# Patient Record
Sex: MALE | URBAN - METROPOLITAN AREA
[De-identification: names, ages, dates, MRNs, and addresses within clinical notes are randomized per-mention and may not be internally consistent; named-entity substitution may affect disease eponyms.]

---

## 2023-08-25 ENCOUNTER — OFFICE VISIT (OUTPATIENT)
Age: 58
End: 2023-08-25
Payer: COMMERCIAL

## 2023-08-25 VITALS
OXYGEN SATURATION: 97 % | BODY MASS INDEX: 30.75 KG/M2 | HEIGHT: 72 IN | WEIGHT: 227 LBS | HEART RATE: 63 BPM | SYSTOLIC BLOOD PRESSURE: 122 MMHG | DIASTOLIC BLOOD PRESSURE: 82 MMHG

## 2023-08-25 DIAGNOSIS — I25.10 ATHEROSCLEROSIS OF NATIVE CORONARY ARTERY OF NATIVE HEART WITHOUT ANGINA PECTORIS: Primary | ICD-10-CM

## 2023-08-25 DIAGNOSIS — E78.5 HYPERLIPIDEMIA, UNSPECIFIED HYPERLIPIDEMIA TYPE: ICD-10-CM

## 2023-08-25 DIAGNOSIS — I10 ESSENTIAL HYPERTENSION: ICD-10-CM

## 2023-08-25 DIAGNOSIS — I65.21 STENOSIS OF RIGHT CAROTID ARTERY: ICD-10-CM

## 2023-08-25 DIAGNOSIS — F17.200 TOBACCO USE DISORDER: ICD-10-CM

## 2023-08-25 DIAGNOSIS — Z95.1 HISTORY OF CORONARY ARTERY BYPASS GRAFT X 3: ICD-10-CM

## 2023-08-25 DIAGNOSIS — I50.22 CHRONIC SYSTOLIC HEART FAILURE (HCC): ICD-10-CM

## 2023-08-25 DIAGNOSIS — I73.9 PAD (PERIPHERAL ARTERY DISEASE) (HCC): ICD-10-CM

## 2023-08-25 DIAGNOSIS — I25.5 ISCHEMIC CARDIOMYOPATHY: ICD-10-CM

## 2023-08-25 PROBLEM — I70.90 UNSPECIFIED ATHEROSCLEROSIS: Status: ACTIVE | Noted: 2023-08-25

## 2023-08-25 PROBLEM — K59.00 CONSTIPATION, UNSPECIFIED: Status: ACTIVE | Noted: 2023-08-25

## 2023-08-25 PROBLEM — I65.29 CAROTID ARTERY STENOSIS: Status: ACTIVE | Noted: 2023-08-25

## 2023-08-25 PROBLEM — M51.36 OTHER INTERVERTEBRAL DISC DEGENERATION, LUMBAR REGION: Status: ACTIVE | Noted: 2023-08-25

## 2023-08-25 PROBLEM — H52.00 HYPEROPIA: Status: ACTIVE | Noted: 2023-08-25

## 2023-08-25 PROBLEM — I21.9 ACUTE MYOCARDIAL INFARCTION (HCC): Status: ACTIVE | Noted: 2023-08-25

## 2023-08-25 PROCEDURE — 93000 ELECTROCARDIOGRAM COMPLETE: CPT | Performed by: INTERNAL MEDICINE

## 2023-08-25 PROCEDURE — 3079F DIAST BP 80-89 MM HG: CPT | Performed by: INTERNAL MEDICINE

## 2023-08-25 PROCEDURE — 99204 OFFICE O/P NEW MOD 45 MIN: CPT | Performed by: INTERNAL MEDICINE

## 2023-08-25 PROCEDURE — 3074F SYST BP LT 130 MM HG: CPT | Performed by: INTERNAL MEDICINE

## 2023-08-25 RX ORDER — CLOPIDOGREL BISULFATE 75 MG/1
75 TABLET ORAL DAILY
COMMUNITY
Start: 2023-07-29

## 2023-08-25 RX ORDER — FUROSEMIDE 40 MG/1
40 TABLET ORAL DAILY
COMMUNITY
Start: 2023-07-28

## 2023-08-25 RX ORDER — ASPIRIN 325 MG
325 TABLET ORAL DAILY
COMMUNITY

## 2023-08-25 RX ORDER — POTASSIUM CHLORIDE 750 MG/1
10 TABLET, EXTENDED RELEASE ORAL DAILY
COMMUNITY

## 2023-08-25 RX ORDER — AMLODIPINE BESYLATE 2.5 MG/1
2.5 TABLET ORAL DAILY
COMMUNITY

## 2023-08-25 RX ORDER — LISINOPRIL 20 MG/1
20 TABLET ORAL DAILY
COMMUNITY
Start: 2023-07-28 | End: 2023-08-25 | Stop reason: ALTCHOICE

## 2023-08-25 RX ORDER — ATORVASTATIN CALCIUM 80 MG/1
80 TABLET, FILM COATED ORAL DAILY
COMMUNITY

## 2023-08-25 RX ORDER — METOPROLOL SUCCINATE 100 MG/1
100 TABLET, EXTENDED RELEASE ORAL DAILY
COMMUNITY
Start: 2023-06-24

## 2023-08-25 ASSESSMENT — ENCOUNTER SYMPTOMS
VOMITING: 0
SHORTNESS OF BREATH: 1
COUGH: 0
ABDOMINAL PAIN: 0
SORE THROAT: 0
NAUSEA: 0
ABDOMINAL DISTENTION: 0

## 2023-08-25 ASSESSMENT — PATIENT HEALTH QUESTIONNAIRE - PHQ9
2. FEELING DOWN, DEPRESSED OR HOPELESS: 0
SUM OF ALL RESPONSES TO PHQ QUESTIONS 1-9: 0
1. LITTLE INTEREST OR PLEASURE IN DOING THINGS: 0
SUM OF ALL RESPONSES TO PHQ QUESTIONS 1-9: 0
SUM OF ALL RESPONSES TO PHQ9 QUESTIONS 1 & 2: 0
SUM OF ALL RESPONSES TO PHQ QUESTIONS 1-9: 0
SUM OF ALL RESPONSES TO PHQ QUESTIONS 1-9: 0

## 2023-08-25 NOTE — PROGRESS NOTES
08/25/23     July De Souza  is a 62 y.o. male     Chief Complaint   Patient presents with    New Patient     To establish care, just moved to the area    Device Check     [employer]       HPI    Patient presents for a new office visit. He was self-referred to establish care with a local cardiologist.  He recently moved to the area from the [address]. He has an extensive cardiac history including a myocardial infarction initially 1993 with multiple PCI's and eventually underwent CABG x3 in 2000. He also suffered a cardiac arrest in 2006 and underwent implantation of a defibrillator for secondary prevention at that time. His defibrillator was recently upgraded to a biventricular AICD just last month after his ejection fraction dropped and his need for chronic ventricular pacing increased. Lastly, he has extensive peripheral vascular disease of both lower extremities, his right subclavian and right carotid artery. He has underwent multiple percutaneous interventions of both legs and his right subclavian. He states he has residual 80% right ICA stenosis. He still reports daily claudication in both legs. He continues to smoke nearly a pack of cigarettes a day. He denies any recent chest pain, no shortness of breath at rest, no orthopnea, no PND. No dizzy spells, prolonged heart palpitations or recent AICD shocks. The patient's wife does admit that he appears to be slowing down over the past 6 months and complaining of increased shortness of breath with physical activity.     Past Medical History:   Diagnosis Date    CAD (coronary artery disease)     Carotid artery stenosis     CHF (congestive heart failure) ([facility])     Hyperlipidemia     Hypertension     MI (myocardial infarction) ([address])      Current Outpatient Medications   Medication Sig Dispense Refill    metoprolol succinate (TOPROL XL) 100 MG extended release tablet Take 1 tablet by mouth daily      furosemide (LASIX) 40 MG tablet Take 1 tablet

## 2023-08-25 NOTE — PROGRESS NOTES
Singh Arechiga presents today for   Chief Complaint   Patient presents with    New Patient     To establish care, just moved to the area    [address] preferred language for health care discussion is english/other. Is someone accompanying this pt? no    Is the patient using any DME equipment during OV? no    Depression Screening:  Depression: Not at risk    PHQ-2 Score: 0        Learning Assessment:  Who is the primary learner? Patient    What is the preferred language for health care of the primary learner? ENGLISH    How does the primary learner prefer to learn new concepts? DEMONSTRATION    Answered By patient    Relationship to Learner SELF           Pt currently taking Anticoagulant therapy? no    Pt currently taking Antiplatelet therapy ?  mg once a day      Coordination of Care:  1. Have you been to the ER, urgent care clinic since your last visit? Hospitalized since your last visit? yes    2. Have you seen or consulted any other health care providers outside of the [address] [place] [address] Avenue since your last visit? Include any pap smears or colon screening.  no

## 2023-08-30 ENCOUNTER — TELEPHONE (OUTPATIENT)
Age: 58
End: 2023-08-30

## 2023-09-21 ENCOUNTER — TELEPHONE (OUTPATIENT)
Age: 58
End: 2023-09-21

## 2023-11-03 ENCOUNTER — CLINICAL DOCUMENTATION (OUTPATIENT)
Age: 58
End: 2023-11-03

## 2023-11-03 NOTE — PROGRESS NOTES
Pt was in office for an echo, and has some concerns for non radiating  right pain Pt was advised to be seen at the ED. He still wanted to have his echo done. He denies chest pain and SOB. Pt verbalized understanding and will go to the ED.

## 2023-11-07 ENCOUNTER — TELEPHONE (OUTPATIENT)
Age: 58
End: 2023-11-07

## 2023-11-14 ENCOUNTER — TELEPHONE (OUTPATIENT)
Age: 58
End: 2023-11-14

## 2023-12-15 ENCOUNTER — TELEPHONE (OUTPATIENT)
Age: 58
End: 2023-12-15

## 2023-12-15 NOTE — TELEPHONE ENCOUNTER
Pt called and informed that per Dr. Alex Phelps he was cleared for vascular sx with Dr. Jake Rosales. Letter faxed and confirmation received.

## 2024-01-04 ENCOUNTER — TELEPHONE (OUTPATIENT)
Age: 59
End: 2024-01-04